# Patient Record
Sex: FEMALE | Race: WHITE | Employment: FULL TIME
[De-identification: names, ages, dates, MRNs, and addresses within clinical notes are randomized per-mention and may not be internally consistent; named-entity substitution may affect disease eponyms.]

---

## 2023-03-09 ENCOUNTER — HOSPITAL ENCOUNTER (OUTPATIENT)
Facility: HOSPITAL | Age: 58
Setting detail: RECURRING SERIES
Discharge: HOME | End: 2023-03-12
Payer: COMMERCIAL

## 2023-03-09 PROCEDURE — 97535 SELF CARE MNGMENT TRAINING: CPT

## 2023-03-09 PROCEDURE — 97161 PT EVAL LOW COMPLEX 20 MIN: CPT

## 2023-03-09 PROCEDURE — 97112 NEUROMUSCULAR REEDUCATION: CPT

## 2023-03-09 NOTE — PROGRESS NOTES
Physical Therapy Evaluation       Patient Name: Lily Batres    Date: 3/9/2023    : 1965  Insurance: Payor: OPTIMA / Plan: [employer] HMO / Product Type: *No Product type* /      Patient  verified yes     Visit #   Current / Total 1 12   Time   In / Out 8:00 8:55   Pain   In / Out 0 0   Subjective Functional Status/Changes: See subjective below   Changes to:  Meds, Allergies, Med Hx, Sx Hx?  If yes, update Summary List no       TREATMENT AREA =  Stress incontinence (female) (male) [N39.3]      SUBJECTIVE    Current symptoms/Complaints: Pt is a 57 year old female who presents to physical therapy with complaints of urinary incontinence, which has been going on for \"years\". It's worsened over the past year. She's currently going through menopause.     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, sitting mostly - tries to get up as much as she can   Exercise/Hobbies: used to walk 6 miles per day, can only make it 3 miles of walking with urinating before and after  Pt Goals: to get off medication she just got on; she wants to be stronger and naturally correct her issues and be able to walk her 6 miles per day    Urinary Symptoms:     Current urinary complaint  Mixed urinary incontinence     Bladder complaint longevity:  5+ years    Bladder symptom progression:  Worsened over past 1-2 years    Frequency of UI: at least 15 times per day, sometimes just  1 drop and other times gushing out     Pad use:  3-4 times per day poise 2 pads, but when she's sick she has to wear a poise 4 replacing it about 10 times     Pad wetness when changed:  Damp     Daytime urinary frequency:  Every 1.5-2 hours hour(s) during the day    Nocturia:  1x/ night    Bowel Symptoms:     Bowel function:  4-5 days/week   Stool Type [facility]): Type 1 - Separate Hard Lumps  Type 2 - Lumpy and Sausage Like  Type 3 - Sausage Shape with Cracks in the Surface  Type 4 - Like a Smooth Sausage or Snake  Type 5 - Soft Blobs with Clear Cut Edges  Type 6 - Mushy Consistency with Ragged Edges   Type 7 - Liquid Consistency with no Solid Pieces    Diet:    Fluid intake:  Fluid  Amount per day   Water 2 - 32 oz bottles of water   Caffeine 8 oz once every few days, herbal teas every few days   Alcohol none   Other none     Weight: 200    Physical Exam Objective Findings:    Pelvic Floor Assessment  Patient was educated on pelvic floor anatomy, structure, and function and implications for current presentation of s/s. Patient consents to pelvic floor assessment. Observation:  Contraction - mild  Bulge - good  Knack - mild reflex    PFM Screen:    Skin Integrity:  [x] Healthy [] Red  [] Labia Atrophy [] Fragile    Sensation: [x] Intact [] Diminished:    Muscle Bulk: [x] Symmetrical  [] Well-developed [] Atrophied:  []L   []R   []B    Prolapse: [x] Cystocele: Grade 1        External trigger point/ muscle tenderness: none    Pelvic floor manual exam: Performed via internal vaginal assessment - no tenderness or tension noted    Pelvic floor MMT    PERF (Performance/Endurance/Repetitions//Flicks): 0/05/5/76    Pelvic muscle release pattern  Full release    30 min []Eval - untimed                               Therapeutic Procedures:   Tx Min Billable or 1:1 Min (if diff from Tx Min) Procedure, Rationale, Specifics   10 10 [id number] Neuromuscular Re-Education (timed):  improve balance, coordination, kinesthetic sense, posture, core stability and proprioception to improve patient's ability to develop conscious control of individual muscles and awareness of position of extremities in order to progress to PLOF and address remaining functional goals. (see flow sheet as applicable)     Details if applicable:     15 15 [id number] Self Care/Home Management (timed):  improve patient knowledge and understanding of physical therapy expectations, procedures and progression and b;adder fitness, urge suppression, bladder irritants  to improve patient's ability to progress to PLOF and address remaining functional goals. (see flow sheet as applicable)     Details if applicable:            Details if applicable:            Details if applicable:            Details if applicable:     25 22 [facility] Totals Reminder: bill using total billable min of TIMED therapeutic procedures (example: do not include dry needle or estim unattended, both untimed codes, in totals to left)  8-22 min = 1 unit; 23-37 min = 2 units; 38-52 min = 3 units; 53-67 min = 4 units; 68-82 min = 5 units   Total Total     [x]  Patient Education billed concurrently with other procedures   [x] Review HEP    [] Progressed/Changed HEP, detail:    [] Other detail:       Pain Level (0-10 scale) post treatment: 0    ASSESSMENT/Changes in Function: Patient is a 56year old female presenting to Physical Therapy with c/o mixed urinary incontinence which is limiting ability function at previous level. Patient presents with decreased strength, coordination, and endurance of the pelvic floor muscles and decreased strength of postural stabilizers. Patient presents with fair understanding of bladder and bowel anatomy/function, dietary irritants, and urge suppression. I feel patient would benefit from skilled therapeutic intervention to optimize highest functional level possible.      Patient will continue to benefit from skilled PT services to modify and progress therapeutic interventions, address functional mobility deficits, address ROM deficits, address strength deficits, analyze and address soft tissue restrictions, analyze and cue movement patterns, analyze and modify body mechanics/ergonomics, and instruct in home and community integration to attain remaining goals. [x]  See Plan of Care  []  See progress note/recertification  []  See Discharge Summary         Progress towards goals / Updated goals:  Short term goals: To be achieved in 6 sessions:  Patient will demonstrate proper dietary/fluid habits and urge suppression strategies that promote bladder and bowel health to aid in management of urinary urgency and incontinence. Status at eval: Patient consuming 64 oz of water and unaware of bladder fitness, dietary irritants and urge suppression strategies. Patient will report improvement in UI complaints evidence by a decrease in pad usage and/or amount of leakage by 25-50% to improve QOL. Status at eval: Patient using 3-4 pads per day, generally damp (amount of leakage). Patient will be able to maintain pelvic floor contraction for 10 seconds, 10 repetitions with no accessory substitution or breath holding. Status at eval: PFMC 10 seconds, 6 repetitions     Long term goals: To be achieved in 12 weeks:  Patient will report bladder continence 50-75% of the time with cough/sneeze/laugh and walking. Status at eval: patient stress and urgency incontinence 15 times  per day    Patient will demonstrate pelvic floor muscle contraction at least 4/5 and ability to maintain contraction for 10 seconds, 10 repetitions.    Status at eval: PFM 3/5, 10 second hold, 6 repetitions    Patient will demonstrate improvement of current complaints evidenced by a 13 point  improvement in FOTO, Pelvic functional disability index score  Status at Eval: Pelvic functional disability index 40    Patient will demonstrate independence with management tools and exercise program that are beneficial for current condition in order to feel comfortable with Pelvic floor PT D/C and not fear exacerbation of current condition or symptoms returning.    Status at eval : patient fearful of return to exercise and unaware of what activities to avoid to avoid exacerbation of current condition    PLAN  []  Upgrade activities as tolerated     [x]  Continue plan of care  []  Update interventions per flow sheet       []  Discharge due to:_  []  Other:_      Corry Reddy, PT 3/9/2023  8:02 AM

## 2023-03-09 NOTE — PROGRESS NOTES
[facility]  [address], [address]  Ph [phone]  Fx [phone]    Plan of Care/ Statement of Necessity for Physical Therapy Services    Patient name: Bryan Darnell Start of Care: 3/9/2023   Referral source: JOSUE Phillips* : 1965    Medical Diagnosis: Stress incontinence (female) (male) [N39.3]   Onset Date:3/1/2021    Treatment Diagnosis: Stress incontinence (female) (male) [N39.3]   Prior Hospitalization: see medical history Provider#: [id number]   Medications: Verified on Patient summary List    Comorbidities: arthritis, thyroid problems, fibromyalgia   Prior Level of Function: no leaking          The Plan of Care and following information is based on the information from the initial evaluation. Assessment/ key information: Patient is a 56year old female presenting to Physical Therapy with c/o mixed urinary incontinence which is limiting ability function at previous level. Patient presents with decreased strength, coordination, and endurance of the pelvic floor muscles and decreased strength of postural stabilizers. Patient presents with fair understanding of bladder and bowel anatomy/function, dietary irritants, and urge suppression. I feel patient would benefit from skilled therapeutic intervention to optimize highest functional level possible. Patient will benefit from skilled PT services to modify and progress therapeutic interventions, address functional mobility deficits, address ROM deficits, address strength deficits, analyze and address soft tissue restrictions, analyze and cue movement patterns, analyze and modify body mechanics/ergonomics, and instruct in home and community integration to attain goals.     Evaluation Complexity HistoryLOW Complexity : Zero comorbidities / personal factors that will impact the outcome / POC ; Examination LOW Complexity : 1-2 Standardized tests and measures addressing body structure, function, activity limitation and / or participation in recreation  ;Presentation LOW Complexity : Stable, uncomplicated  ;Clinical Decision Making MEDIUM Complexity : FOTO score of 26-74 FOTO score = an established functional score where 100 = no disability  Overall Complexity Rating: LOW     Problem List: Pelvic pain/dysfunction, Decreased pelvic floor mm strength, Improper voiding habits, Urinary urgency, and Other  Treatment Plan may include any combination of the following: Therapeutic exercise, Urge suppression techniques, Neuromuscular re-education, Manual therapy, Patient education, and Other  Patient / Family readiness to learn indicated by: asking questions, trying to perform skills, interest, return verbalization , and return demonstration   Persons(s) to be included in education: patient (P)  Barriers to Learning/Limitations: None  Patient Goal (s): to get off medication she just got on; she wants to be stronger and naturally correct her issues and be able to walk her 6 miles per day  Patient Self Reported Health Status: good  Rehabilitation Potential: good    Short term goals: To be achieved in 6 sessions:  Patient will demonstrate proper dietary/fluid habits and urge suppression strategies that promote bladder and bowel health to aid in management of urinary urgency and incontinence. Status at eval: Patient consuming 64 oz of water and unaware of bladder fitness, dietary irritants and urge suppression strategies. Patient will report improvement in UI complaints evidence by a decrease in pad usage and/or amount of leakage by 25-50% to improve QOL. Status at eval: Patient using 3-4 pads per day, generally damp (amount of leakage). Patient will be able to maintain pelvic floor contraction for 10 seconds, 10 repetitions with no accessory substitution or breath holding. Status at eval: PFMC 10 seconds, 6 repetitions      Long term goals:  To be achieved in 12 weeks:  Patient will report bladder continence 50-75% of the time with cough/sneeze/laugh and walking. Status at eval: patient stress and urgency incontinence 15 times  per day     Patient will demonstrate pelvic floor muscle contraction at least 4/5 and ability to maintain contraction for 10 seconds, 10 repetitions. Status at eval: PFM 3/5, 10 second hold, 6 repetitions     Patient will demonstrate improvement of current complaints evidenced by a 13 point  improvement in FOTO, Pelvic functional disability index score  Status at Eval: Pelvic functional disability index 40     Patient will demonstrate independence with management tools and exercise program that are beneficial for current condition in order to feel comfortable with Pelvic floor PT D/C and not fear exacerbation of current condition or symptoms returning. Status at eval : patient fearful of return to exercise and unaware of what activities to avoid to avoid exacerbation of current condition    Frequency / Duration: Patient to be seen for 12 sessions    Patient/ Caregiver education and instruction: Diagnosis, prognosis, self care, activity modification, and exercises     [x]  Plan of care has been reviewed with PTA    Certification Period: 03/09/2023 - 06/01/2023    Declan Alford, PT 3/9/2023 9:43 AM    ________________________________________________________________________    I certify that the above Therapy Services are being furnished while the patient is under my care. I agree with the treatment plan and certify that this therapy is necessary.     Physician's Signature:____________________  Date:____________Time:____________                                      Gwenevere Gilford, APRN*      Please sign and return to [facility]  [address], [address]  Ph [phone]  Fx [phone]

## 2023-03-24 ENCOUNTER — TELEPHONE (OUTPATIENT)
Facility: HOSPITAL | Age: 58
End: 2023-03-24

## 2023-03-28 ENCOUNTER — HOSPITAL ENCOUNTER (OUTPATIENT)
Facility: HOSPITAL | Age: 58
Setting detail: RECURRING SERIES
Discharge: HOME | End: 2023-03-31
Payer: COMMERCIAL

## 2023-03-28 PROCEDURE — 97530 THERAPEUTIC ACTIVITIES: CPT

## 2023-03-28 PROCEDURE — 97112 NEUROMUSCULAR REEDUCATION: CPT

## 2023-03-28 PROCEDURE — 97110 THERAPEUTIC EXERCISES: CPT

## 2023-03-28 NOTE — PROGRESS NOTES
PHYSICAL / OCCUPATIONAL THERAPY - DAILY TREATMENT NOTE (updated )    Patient Name: Skylar Suarez    Date: 3/28/2023    : 1965  Insurance: Payor: Ursula Cranker / Plan: OPTIMA HMO / Product Type: *No Product type* /      Patient  verified Yes     Visit #   Current / Total 2 12   Time   In / Out 4:30 5:23   Pain   In / Out 0 0   Subjective Functional Status/Changes: Pt reports only leaking once, sneezing while standing   Changes to:  Meds, Allergies, Med Hx, Sx Hx? If yes, update Summary List no       TREATMENT AREA =  Stress incontinence (female) (male) [N39.3]    OBJECTIVE    Therapeutic Procedures: Tx Min Billable or 1:1 Min (if diff from Tx Min) Procedure, Rationale, Specifics   10  47771 Therapeutic Exercise (timed):  increase ROM, strength, coordination, balance, and proprioception to improve patient's ability to progress to PLOF and address remaining functional goals. (see flow sheet as applicable)     Details if applicable:       10  95097 Therapeutic Activity (timed):  use of dynamic activities replicating functional movements to increase ROM, strength, coordination, balance, and proprioception in order to improve patient's ability to progress to PLOF and address remaining functional goals. (see flow sheet as applicable)     Details if applicable:  bladder education   33  62057 Neuromuscular Re-Education (timed):  improve balance, coordination, kinesthetic sense, posture, core stability and proprioception to improve patient's ability to develop conscious control of individual muscles and awareness of position of extremities in order to progress to PLOF and address remaining functional goals.  (see flow sheet as applicable)     Details if applicable:            Details if applicable:            Details if applicable:     48  [address] Totals Reminder: bill using total billable min of TIMED therapeutic procedures (example: do not include dry needle or estim unattended, both untimed codes, in totals to

## 2023-04-04 ENCOUNTER — HOSPITAL ENCOUNTER (OUTPATIENT)
Facility: HOSPITAL | Age: 58
Setting detail: RECURRING SERIES
Discharge: HOME | End: 2023-04-07
Payer: COMMERCIAL

## 2023-04-04 PROCEDURE — 97112 NEUROMUSCULAR REEDUCATION: CPT

## 2023-04-04 PROCEDURE — 97530 THERAPEUTIC ACTIVITIES: CPT

## 2023-04-04 PROCEDURE — 97110 THERAPEUTIC EXERCISES: CPT

## 2023-04-04 NOTE — PROGRESS NOTES
10 seconds, 10 repetitions with no accessory substitution or breath holding. Status at eval: PFMC 10 seconds, 6 repetitions   Current:   Surface EMG demonstrated good motor recruitment and some fatigue with 10 second holds  Progressing 4/4/2023     Long term goals: To be achieved in 9 weeks:  Patient will report bladder continence 50-75% of the time with cough/sneeze/laugh and walking. Status at eval: patient stress and urgency incontinence 15 times  per day  Current: pt thinks she's going to the bathroom every 2 - 2.5 hours apart 3/28/2023 progressing  Current:  Patient reports using one pad/day and has 1 episode of leakage after drinking a glass of wine. Patient reports feeling about 80% better. 4/4/2023  Progressing     Patient will demonstrate pelvic floor muscle contraction at least 4/5 and ability to maintain contraction for 10 seconds, 10 repetitions. Status at eval: PFM 3/5, 10 second hold, 6 repetitions  Current:   Surface EMG demonstrated good motor recruitment and some fatigue with 10 second holds  Progressing 4/4/2023     Patient will demonstrate improvement of current complaints evidenced by a 13 point  improvement in FOTO, Pelvic functional disability index score  Status at Eval: Pelvic functional disability index 40  Current: Will test at the 5th visit 4/4/2023     Patient will demonstrate independence with management tools and exercise program that are beneficial for current condition in order to feel comfortable with Pelvic floor PT D/C and not fear exacerbation of current condition or symptoms returning. Status at eval : patient fearful of return to exercise and unaware of what activities to avoid to avoid exacerbation of current condition  Current:  Patient reports good compliance with HEP and pelvic floor contractions.   Progressing 4/4/2023       Summary of Care/ Key Functional Changes: Patient is a 56year old female who has attended 3 physical therapy appointments for urinary
repetitions  Current:   Surface EMG demonstrated good motor recruitment and some fatigue with 10 second holds  Progressing 4/4/2023     Patient will demonstrate improvement of current complaints evidenced by a 13 point  improvement in FOTO, Pelvic functional disability index score  Status at Eval: Pelvic functional disability index 40  Current: Will test at the 5th visit 4/4/2023     Patient will demonstrate independence with management tools and exercise program that are beneficial for current condition in order to feel comfortable with Pelvic floor PT D/C and not fear exacerbation of current condition or symptoms returning. Status at eval : patient fearful of return to exercise and unaware of what activities to avoid to avoid exacerbation of current condition  Current:  Patient reports good compliance with HEP and pelvic floor contractions.   Progressing 4/4/2023          PLAN  Yes  Continue plan of care  []  Upgrade activities as tolerated  []  Discharge due to :  []  Other:    Cathleen Mar, PT    4/4/2023    7:58 AM    Future Appointments   Date Time Provider Hayder Roy   4/4/2023  8:00 AM Cathleen Mar, PT [facility]   4/11/2023  8:00 AM Jenny Howard PT [facility]

## 2023-04-13 ENCOUNTER — HOSPITAL ENCOUNTER (INPATIENT)
Facility: HOSPITAL | Age: 58
LOS: 1 days | Discharge: HOME | DRG: 287 | End: 2023-04-14
Attending: EMERGENCY MEDICINE | Admitting: FAMILY MEDICINE
Payer: COMMERCIAL

## 2023-04-13 ENCOUNTER — HOSPITAL ENCOUNTER (OUTPATIENT)
Facility: HOSPITAL | Age: 58
Discharge: HOME | End: 2023-04-16
Payer: COMMERCIAL

## 2023-04-13 ENCOUNTER — APPOINTMENT (OUTPATIENT)
Facility: HOSPITAL | Age: 58
DRG: 287 | End: 2023-04-13
Payer: COMMERCIAL

## 2023-04-13 DIAGNOSIS — Z82.49 FAMILY HISTORY OF CORONARY ARTERY DISEASE: ICD-10-CM

## 2023-04-13 DIAGNOSIS — R07.2 PRECORDIAL CHEST PAIN: Primary | ICD-10-CM

## 2023-04-13 DIAGNOSIS — I20.0 UNSTABLE ANGINA (HCC): ICD-10-CM

## 2023-04-13 DIAGNOSIS — R07.9 CHEST PAIN, UNSPECIFIED TYPE: ICD-10-CM

## 2023-04-13 DIAGNOSIS — R07.89 OTHER CHEST PAIN: ICD-10-CM

## 2023-04-13 DIAGNOSIS — R07.9 ACUTE CHEST PAIN: Primary | ICD-10-CM

## 2023-04-13 DIAGNOSIS — R94.39 ABNORMAL STRESS TEST: ICD-10-CM

## 2023-04-13 PROBLEM — E03.9 HYPOTHYROIDISM: Status: ACTIVE | Noted: 2023-04-13

## 2023-04-13 LAB
ANION GAP SERPL CALC-SCNC: 2 MMOL/L (ref 3–18)
APTT PPP: 25.9 SEC (ref 23–36.4)
BASOPHILS # BLD: 0 K/UL (ref 0–0.1)
BASOPHILS NFR BLD: 1 % (ref 0–2)
BUN SERPL-MCNC: 24 MG/DL (ref 7–18)
BUN/CREAT SERPL: 25 (ref 12–20)
CALCIUM SERPL-MCNC: 9.4 MG/DL (ref 8.5–10.1)
CHLORIDE SERPL-SCNC: 116 MMOL/L (ref 100–111)
CO2 SERPL-SCNC: 26 MMOL/L (ref 21–32)
CREAT SERPL-MCNC: 0.96 MG/DL (ref 0.6–1.3)
DIFFERENTIAL METHOD BLD: NORMAL
ECHO BSA: 2.01 M2
EOSINOPHIL # BLD: 0.2 K/UL (ref 0–0.4)
EOSINOPHIL NFR BLD: 4 % (ref 0–5)
ERYTHROCYTE [DISTWIDTH] IN BLOOD BY AUTOMATED COUNT: 12.3 % (ref 11.6–14.5)
GLUCOSE SERPL-MCNC: 85 MG/DL (ref 74–99)
HCT VFR BLD AUTO: 42.9 % (ref 35–45)
HGB BLD-MCNC: 14.4 G/DL (ref 12–16)
IMM GRANULOCYTES # BLD AUTO: 0 K/UL (ref 0–0.04)
IMM GRANULOCYTES NFR BLD AUTO: 0 % (ref 0–0.5)
INR PPP: 0.9 (ref 0.8–1.2)
LYMPHOCYTES # BLD: 1.6 K/UL (ref 0.9–3.6)
LYMPHOCYTES NFR BLD: 34 % (ref 21–52)
MCH RBC QN AUTO: 30 PG (ref 24–34)
MCHC RBC AUTO-ENTMCNC: 33.6 G/DL (ref 31–37)
MCV RBC AUTO: 89.4 FL (ref 78–100)
MONOCYTES # BLD: 0.3 K/UL (ref 0.05–1.2)
MONOCYTES NFR BLD: 7 % (ref 3–10)
NEUTS SEG # BLD: 2.6 K/UL (ref 1.8–8)
NEUTS SEG NFR BLD: 54 % (ref 40–73)
NRBC # BLD: 0 K/UL (ref 0–0.01)
NRBC BLD-RTO: 0 PER 100 WBC
NUC STRESS EJECTION FRACTION: 76 %
PLATELET # BLD AUTO: 190 K/UL (ref 135–420)
PMV BLD AUTO: 10.4 FL (ref 9.2–11.8)
POTASSIUM SERPL-SCNC: 3.7 MMOL/L (ref 3.5–5.5)
PROTHROMBIN TIME: 12.8 SEC (ref 11.5–15.2)
RBC # BLD AUTO: 4.8 M/UL (ref 4.2–5.3)
SODIUM SERPL-SCNC: 144 MMOL/L (ref 136–145)
STRESS BASELINE ST DEPRESSION: 0 MM
STRESS ESTIMATED WORKLOAD: 1 METS
STRESS PEAK DIAS BP: 92 MMHG
STRESS PEAK SYS BP: 166 MMHG
STRESS PERCENT HR ACHIEVED: 73 %
STRESS POST PEAK HR: 121 BPM
STRESS RATE PRESSURE PRODUCT: NORMAL BPM*MMHG
STRESS TARGET HR: 165 BPM
TID: 0.97
TROPONIN I SERPL HS-MCNC: 4 NG/L (ref 0–54)
TROPONIN I SERPL HS-MCNC: 4 NG/L (ref 0–54)
TROPONIN I SERPL HS-MCNC: 5 NG/L (ref 0–54)
WBC # BLD AUTO: 4.7 K/UL (ref 4.6–13.2)

## 2023-04-13 PROCEDURE — A9500 TC99M SESTAMIBI: HCPCS | Performed by: INTERNAL MEDICINE

## 2023-04-13 PROCEDURE — 93005 ELECTROCARDIOGRAM TRACING: CPT | Performed by: EMERGENCY MEDICINE

## 2023-04-13 PROCEDURE — G0378 HOSPITAL OBSERVATION PER HR: HCPCS

## 2023-04-13 PROCEDURE — 71046 X-RAY EXAM CHEST 2 VIEWS: CPT

## 2023-04-13 PROCEDURE — 93017 CV STRESS TEST TRACING ONLY: CPT

## 2023-04-13 PROCEDURE — 36415 COLL VENOUS BLD VENIPUNCTURE: CPT

## 2023-04-13 PROCEDURE — 85025 COMPLETE CBC W/AUTO DIFF WBC: CPT

## 2023-04-13 PROCEDURE — 84443 ASSAY THYROID STIM HORMONE: CPT

## 2023-04-13 PROCEDURE — 96372 THER/PROPH/DIAG INJ SC/IM: CPT

## 2023-04-13 PROCEDURE — 99285 EMERGENCY DEPT VISIT HI MDM: CPT

## 2023-04-13 PROCEDURE — 6370000000 HC RX 637 (ALT 250 FOR IP): Performed by: FAMILY MEDICINE

## 2023-04-13 PROCEDURE — 84484 ASSAY OF TROPONIN QUANT: CPT

## 2023-04-13 PROCEDURE — 3430000000 HC RX DIAGNOSTIC RADIOPHARMACEUTICAL: Performed by: INTERNAL MEDICINE

## 2023-04-13 PROCEDURE — 80048 BASIC METABOLIC PNL TOTAL CA: CPT

## 2023-04-13 PROCEDURE — 6360000002 HC RX W HCPCS: Performed by: EMERGENCY MEDICINE

## 2023-04-13 PROCEDURE — 1100000003 HC PRIVATE W/ TELEMETRY

## 2023-04-13 PROCEDURE — 85610 PROTHROMBIN TIME: CPT

## 2023-04-13 PROCEDURE — 6360000002 HC RX W HCPCS: Performed by: INTERNAL MEDICINE

## 2023-04-13 PROCEDURE — 85730 THROMBOPLASTIN TIME PARTIAL: CPT

## 2023-04-13 PROCEDURE — 6370000000 HC RX 637 (ALT 250 FOR IP): Performed by: EMERGENCY MEDICINE

## 2023-04-13 RX ORDER — ASPIRIN 81 MG/1
81 TABLET, CHEWABLE ORAL DAILY
Status: DISCONTINUED | OUTPATIENT
Start: 2023-04-14 | End: 2023-04-14 | Stop reason: HOSPADM

## 2023-04-13 RX ORDER — OMEPRAZOLE 40 MG/1
1 CAPSULE, DELAYED RELEASE ORAL DAILY
Status: ON HOLD | COMMUNITY
Start: 2023-04-10 | End: 2023-04-13

## 2023-04-13 RX ORDER — ENOXAPARIN SODIUM 100 MG/ML
1 INJECTION SUBCUTANEOUS EVERY 12 HOURS
Status: DISCONTINUED | OUTPATIENT
Start: 2023-04-13 | End: 2023-04-14

## 2023-04-13 RX ORDER — MORPHINE SULFATE 4 MG/ML
4 INJECTION, SOLUTION INTRAMUSCULAR; INTRAVENOUS EVERY 4 HOURS PRN
Status: DISCONTINUED | OUTPATIENT
Start: 2023-04-13 | End: 2023-04-14 | Stop reason: HOSPADM

## 2023-04-13 RX ORDER — NITROGLYCERIN 0.4 MG/1
0.4 TABLET SUBLINGUAL EVERY 5 MIN PRN
Status: DISCONTINUED | OUTPATIENT
Start: 2023-04-13 | End: 2023-04-14 | Stop reason: HOSPADM

## 2023-04-13 RX ORDER — SODIUM CHLORIDE 0.9 % (FLUSH) 0.9 %
5-40 SYRINGE (ML) INJECTION PRN
Status: DISCONTINUED | OUTPATIENT
Start: 2023-04-13 | End: 2023-04-14 | Stop reason: HOSPADM

## 2023-04-13 RX ORDER — ASPIRIN 81 MG/1
1 TABLET ORAL DAILY
COMMUNITY
Start: 2023-04-10

## 2023-04-13 RX ORDER — VIBEGRON 75 MG/1
1 TABLET, FILM COATED ORAL DAILY
COMMUNITY
Start: 2023-02-27

## 2023-04-13 RX ORDER — POLYETHYLENE GLYCOL 3350 17 G/17G
17 POWDER, FOR SOLUTION ORAL DAILY PRN
Status: DISCONTINUED | OUTPATIENT
Start: 2023-04-13 | End: 2023-04-14 | Stop reason: HOSPADM

## 2023-04-13 RX ORDER — ONDANSETRON 4 MG/1
4 TABLET, ORALLY DISINTEGRATING ORAL EVERY 8 HOURS PRN
Status: DISCONTINUED | OUTPATIENT
Start: 2023-04-13 | End: 2023-04-14 | Stop reason: HOSPADM

## 2023-04-13 RX ORDER — SODIUM CHLORIDE 9 MG/ML
INJECTION, SOLUTION INTRAVENOUS PRN
Status: DISCONTINUED | OUTPATIENT
Start: 2023-04-13 | End: 2023-04-14 | Stop reason: HOSPADM

## 2023-04-13 RX ORDER — SODIUM CHLORIDE 0.9 % (FLUSH) 0.9 %
5-40 SYRINGE (ML) INJECTION EVERY 12 HOURS SCHEDULED
Status: DISCONTINUED | OUTPATIENT
Start: 2023-04-13 | End: 2023-04-14 | Stop reason: HOSPADM

## 2023-04-13 RX ORDER — ISOSORBIDE DINITRATE 5 MG/1
1 TABLET ORAL 2 TIMES DAILY
COMMUNITY
Start: 2023-04-10

## 2023-04-13 RX ORDER — TETRAKIS(2-METHOXYISOBUTYLISOCYANIDE)COPPER(I) TETRAFLUOROBORATE 1 MG/ML
11.1 INJECTION, POWDER, LYOPHILIZED, FOR SOLUTION INTRAVENOUS
Status: COMPLETED | OUTPATIENT
Start: 2023-04-13 | End: 2023-04-13

## 2023-04-13 RX ORDER — ACETAMINOPHEN 325 MG/1
650 TABLET ORAL EVERY 6 HOURS PRN
Status: DISCONTINUED | OUTPATIENT
Start: 2023-04-13 | End: 2023-04-14 | Stop reason: HOSPADM

## 2023-04-13 RX ORDER — ATORVASTATIN CALCIUM 20 MG/1
40 TABLET, FILM COATED ORAL NIGHTLY
Status: DISCONTINUED | OUTPATIENT
Start: 2023-04-13 | End: 2023-04-14 | Stop reason: HOSPADM

## 2023-04-13 RX ORDER — ASPIRIN 81 MG/1
324 TABLET, CHEWABLE ORAL
Status: COMPLETED | OUTPATIENT
Start: 2023-04-13 | End: 2023-04-13

## 2023-04-13 RX ORDER — ACETAMINOPHEN 650 MG/1
650 SUPPOSITORY RECTAL EVERY 6 HOURS PRN
Status: DISCONTINUED | OUTPATIENT
Start: 2023-04-13 | End: 2023-04-14 | Stop reason: HOSPADM

## 2023-04-13 RX ORDER — ONDANSETRON 2 MG/ML
4 INJECTION INTRAMUSCULAR; INTRAVENOUS EVERY 6 HOURS PRN
Status: DISCONTINUED | OUTPATIENT
Start: 2023-04-13 | End: 2023-04-14 | Stop reason: HOSPADM

## 2023-04-13 RX ORDER — TETRAKIS(2-METHOXYISOBUTYLISOCYANIDE)COPPER(I) TETRAFLUOROBORATE 1 MG/ML
35.4 INJECTION, POWDER, LYOPHILIZED, FOR SOLUTION INTRAVENOUS
Status: COMPLETED | OUTPATIENT
Start: 2023-04-13 | End: 2023-04-13

## 2023-04-13 RX ORDER — LEVOTHYROXINE SODIUM 0.12 MG/1
125 TABLET ORAL
COMMUNITY

## 2023-04-13 RX ADMIN — TETRAKIS(2-METHOXYISOBUTYLISOCYANIDE)COPPER(I) TETRAFLUOROBORATE 11.1 MILLICURIE: 1 INJECTION, POWDER, LYOPHILIZED, FOR SOLUTION INTRAVENOUS at 08:56

## 2023-04-13 RX ADMIN — REGADENOSON 0.4 MG: 0.08 INJECTION, SOLUTION INTRAVENOUS at 11:19

## 2023-04-13 RX ADMIN — ATORVASTATIN CALCIUM 40 MG: 20 TABLET, FILM COATED ORAL at 20:12

## 2023-04-13 RX ADMIN — TETRAKIS(2-METHOXYISOBUTYLISOCYANIDE)COPPER(I) TETRAFLUOROBORATE 35.4 MILLICURIE: 1 INJECTION, POWDER, LYOPHILIZED, FOR SOLUTION INTRAVENOUS at 11:19

## 2023-04-13 RX ADMIN — ASPIRIN 324 MG: 81 TABLET, CHEWABLE ORAL at 12:35

## 2023-04-13 RX ADMIN — ENOXAPARIN SODIUM 90 MG: 100 INJECTION SUBCUTANEOUS at 13:23

## 2023-04-13 ASSESSMENT — PAIN SCALES - GENERAL
PAINLEVEL_OUTOF10: 0
PAINLEVEL_OUTOF10: 0

## 2023-04-13 NOTE — H&P
mucosa, and tongue normal.    Neck: Supple, symmetrical, trachea midline, no adenopathy. Lungs:   Clear to auscultation bilaterally. Heart:  Regular rate and rhythm, S1, S2 normal, no murmur, click, rub or gallop. Abdomen: Soft, non-tender. Bowel sounds normal. No masses,  No organomegaly. Extremities: Extremities normal, atraumatic, no cyanosis or edema. Capillary refill normal.   Pulses: 2+ and symmetric all extremities. Skin: Skin color pink, turgor normal. No rashes or lesions   Neurologic: CNII-XII intact. No focal motor or sensory deficit.        Labs Reviewed:    Recent Results (from the past 24 hour(s))   Transthoracic echocardiogram (TTE) complete with contrast, bubble, strain, and 3D PRN    Collection Time: 04/13/23  8:59 AM   Result Value Ref Range    IVSd 1.0 (A) 0.6 - 0.9 cm    LVIDd 5.2 3.9 - 5.3 cm    LVIDs 4.0 cm    LVOT Diameter 2.0 cm    LVPWd 0.9 0.6 - 0.9 cm    EF BP 60 55 - 100 %    LV Ejection Fraction A2C 64 %    LV Ejection Fraction A4C 53 %    LV EDV A2C 109 mL    LV EDV A4C 109 mL    LV EDV  (A) 56 - 104 mL    LV ESV A2C 39 mL    LV ESV A4C 52 mL    LV ESV BP 45 19 - 49 mL    LVOT Peak Gradient 3 mmHg    LVOT Mean Gradient 2 mmHg    LVOT SV 62.8 ml    LVOT Peak Velocity 0.9 m/s    LVOT VTI 20.0 cm    RVIDd 3.9 cm    RV Free Wall Peak S' 10 cm/s    RVOT Peak Gradient 1 mmHg    RVOT Peak Velocity 0.6 m/s    LA Diameter 4.4 cm    LA Volume A/L 58 mL    LA Volume 2C 58 (A) 22 - 52 mL    LA Volume 4C 55 (A) 22 - 52 mL    LA Volume BP 53 (A) 22 - 52 mL    RA Volume 38 ml    RA Volume 34 ml    AV Area by Peak Velocity 2.4 cm2    AV Area by VTI 2.4 cm2    AV Peak Gradient 5 mmHg    AV Mean Gradient 3 mmHg    AV Peak Velocity 1.2 m/s    AV Mean Velocity 0.8 m/s    AV VTI 27.2 cm    MV A Velocity 0.59 m/s    MV E Wave Deceleration Time 194.6 ms    MV E Velocity 0.59 m/s    LV E' Lateral Velocity 9 cm/s    LV E' Septal Velocity 7 cm/s    PV Peak Gradient 3 mmHg    PV Max

## 2023-04-13 NOTE — ED PROVIDER NOTES
[facility]  EMERGENCY DEPARTMENT ENCOUNTER    Patient Name: Gunjan Monterroso  MRN: [id number]  YOB: 1965  Provider: Sobeida Ahumada MD  PCP: Gabriella Sebastian DO   Time/Date of evaluation: 12:17 PM EDT on 4/13/23    History of Presenting Illness     History Provided by: Patient  History is limited by: Nothing     HISTORY:   Gunjan Monterroso is a 56 y.o. female presenting to the emergency department after being sent in by her cardiologist Dr. Rogelio Manuel. She was having a stress test today and developed chest pain. She was given nitroglycerin after the stress test which did relieve the pain. She has about a 4-month history of left-sided chest tightness that goes into her back left arm and jaw. This is intermittent and not necessarily caused by exertion. Today the nuclear stress test because the same pain. She does have some shortness of breath with the pain. She is currently asymptomatic. She denies any current chest pain or shortness of breath. Denies any back pain. No known risk factors for DVT or PE. Nursing Notes were all reviewed and agreed with or any disagreements were addressed in the HPI. Past History     PAST MEDICAL HISTORY:  No past medical history on file. PAST SURGICAL HISTORY:  No past surgical history on file. FAMILY HISTORY:  No family history on file. SOCIAL HISTORY:       MEDICATIONS:  Current Facility-Administered Medications   Medication Dose Route Frequency Provider Last Rate Last Admin    enoxaparin (LOVENOX) injection 90 mg  1 mg/kg SubCUTAneous Q12H Sobeida Ahumada MD   90 mg at 04/13/23 1323     No current outpatient medications on file.      Facility-Administered Medications Ordered in Other Encounters   Medication Dose Route Frequency Provider Last Rate Last Admin    nitroGLYCERIN (NITROLINGUAL) 0.4 mg spray 1 spray  1 spray SubLINGual Q5 Min PRN Sanna Medina MD   1 spray at 04/13/23 1140       ALLERGIES:  Allergies   Allergen Reactions unknown

## 2023-04-13 NOTE — PROGRESS NOTES
Received pt from ED in stable condition. Vitals completed, skin check completed, admission questions completed. Tele box attached.  is at the bedside.

## 2023-04-14 VITALS
OXYGEN SATURATION: 99 % | HEART RATE: 81 BPM | WEIGHT: 207.67 LBS | BODY MASS INDEX: 38.6 KG/M2 | SYSTOLIC BLOOD PRESSURE: 125 MMHG | TEMPERATURE: 97.6 F | RESPIRATION RATE: 20 BRPM | DIASTOLIC BLOOD PRESSURE: 83 MMHG

## 2023-04-14 LAB
ALBUMIN SERPL-MCNC: 3.5 G/DL (ref 3.4–5)
ALBUMIN/GLOB SERPL: 1.3 (ref 0.8–1.7)
ALP SERPL-CCNC: 55 U/L (ref 45–117)
ALT SERPL-CCNC: 21 U/L (ref 13–56)
ANION GAP SERPL CALC-SCNC: 4 MMOL/L (ref 3–18)
AST SERPL-CCNC: 15 U/L (ref 10–38)
BILIRUB SERPL-MCNC: 0.7 MG/DL (ref 0.2–1)
BUN SERPL-MCNC: 22 MG/DL (ref 7–18)
BUN/CREAT SERPL: 25 (ref 12–20)
CALCIUM SERPL-MCNC: 9.1 MG/DL (ref 8.5–10.1)
CHLORIDE SERPL-SCNC: 113 MMOL/L (ref 100–111)
CHOLEST SERPL-MCNC: 191 MG/DL
CO2 SERPL-SCNC: 25 MMOL/L (ref 21–32)
CREAT SERPL-MCNC: 0.89 MG/DL (ref 0.6–1.3)
EKG ATRIAL RATE: 64 BPM
EKG ATRIAL RATE: 73 BPM
EKG DIAGNOSIS: NORMAL
EKG DIAGNOSIS: NORMAL
EKG P AXIS: 26 DEGREES
EKG P AXIS: 51 DEGREES
EKG P-R INTERVAL: 166 MS
EKG P-R INTERVAL: 186 MS
EKG Q-T INTERVAL: 390 MS
EKG Q-T INTERVAL: 400 MS
EKG QRS DURATION: 86 MS
EKG QRS DURATION: 90 MS
EKG QTC CALCULATION (BAZETT): 402 MS
EKG QTC CALCULATION (BAZETT): 440 MS
EKG R AXIS: 23 DEGREES
EKG R AXIS: 32 DEGREES
EKG T AXIS: 32 DEGREES
EKG T AXIS: 35 DEGREES
EKG VENTRICULAR RATE: 64 BPM
EKG VENTRICULAR RATE: 73 BPM
ERYTHROCYTE [DISTWIDTH] IN BLOOD BY AUTOMATED COUNT: 12.4 % (ref 11.6–14.5)
EST. AVERAGE GLUCOSE BLD GHB EST-MCNC: 91 MG/DL
GLOBULIN SER CALC-MCNC: 2.7 G/DL (ref 2–4)
GLUCOSE SERPL-MCNC: 85 MG/DL (ref 74–99)
HBA1C MFR BLD: 4.8 % (ref 4.2–5.6)
HCG SERPL QL: NEGATIVE
HCT VFR BLD AUTO: 39.6 % (ref 35–45)
HDLC SERPL-MCNC: 50 MG/DL (ref 40–60)
HDLC SERPL: 3.8 (ref 0–5)
HGB BLD-MCNC: 13.2 G/DL (ref 12–16)
LDLC SERPL CALC-MCNC: 110 MG/DL (ref 0–100)
LIPID PANEL: ABNORMAL
MCH RBC QN AUTO: 29.7 PG (ref 24–34)
MCHC RBC AUTO-ENTMCNC: 33.3 G/DL (ref 31–37)
MCV RBC AUTO: 89.2 FL (ref 78–100)
NRBC # BLD: 0 K/UL (ref 0–0.01)
NRBC BLD-RTO: 0 PER 100 WBC
PLATELET # BLD AUTO: 174 K/UL (ref 135–420)
PMV BLD AUTO: 10.3 FL (ref 9.2–11.8)
POTASSIUM SERPL-SCNC: 3.9 MMOL/L (ref 3.5–5.5)
PROT SERPL-MCNC: 6.2 G/DL (ref 6.4–8.2)
RBC # BLD AUTO: 4.44 M/UL (ref 4.2–5.3)
SODIUM SERPL-SCNC: 142 MMOL/L (ref 136–145)
TRIGL SERPL-MCNC: 155 MG/DL
TROPONIN I SERPL HS-MCNC: 5 NG/L (ref 0–54)
TROPONIN I SERPL HS-MCNC: 6 NG/L (ref 0–54)
TSH SERPL DL<=0.05 MIU/L-ACNC: 0.98 UIU/ML (ref 0.36–3.74)
VLDLC SERPL CALC-MCNC: 31 MG/DL
WBC # BLD AUTO: 4.4 K/UL (ref 4.6–13.2)

## 2023-04-14 PROCEDURE — 6370000000 HC RX 637 (ALT 250 FOR IP): Performed by: INTERNAL MEDICINE

## 2023-04-14 PROCEDURE — C1894 INTRO/SHEATH, NON-LASER: HCPCS | Performed by: INTERNAL MEDICINE

## 2023-04-14 PROCEDURE — G0378 HOSPITAL OBSERVATION PER HR: HCPCS

## 2023-04-14 PROCEDURE — 2500000003 HC RX 250 WO HCPCS: Performed by: INTERNAL MEDICINE

## 2023-04-14 PROCEDURE — 6370000000 HC RX 637 (ALT 250 FOR IP): Performed by: FAMILY MEDICINE

## 2023-04-14 PROCEDURE — 6360000004 HC RX CONTRAST MEDICATION: Performed by: INTERNAL MEDICINE

## 2023-04-14 PROCEDURE — 93458 L HRT ARTERY/VENTRICLE ANGIO: CPT | Performed by: INTERNAL MEDICINE

## 2023-04-14 PROCEDURE — 2709999900 HC NON-CHARGEABLE SUPPLY: Performed by: INTERNAL MEDICINE

## 2023-04-14 PROCEDURE — 84703 CHORIONIC GONADOTROPIN ASSAY: CPT

## 2023-04-14 PROCEDURE — 99152 MOD SED SAME PHYS/QHP 5/>YRS: CPT | Performed by: INTERNAL MEDICINE

## 2023-04-14 PROCEDURE — 84484 ASSAY OF TROPONIN QUANT: CPT

## 2023-04-14 PROCEDURE — B2111ZZ FLUOROSCOPY OF MULTIPLE CORONARY ARTERIES USING LOW OSMOLAR CONTRAST: ICD-10-PCS | Performed by: INTERNAL MEDICINE

## 2023-04-14 PROCEDURE — C1769 GUIDE WIRE: HCPCS | Performed by: INTERNAL MEDICINE

## 2023-04-14 PROCEDURE — 80053 COMPREHEN METABOLIC PANEL: CPT

## 2023-04-14 PROCEDURE — 83036 HEMOGLOBIN GLYCOSYLATED A1C: CPT

## 2023-04-14 PROCEDURE — 6360000002 HC RX W HCPCS: Performed by: INTERNAL MEDICINE

## 2023-04-14 PROCEDURE — B2151ZZ FLUOROSCOPY OF LEFT HEART USING LOW OSMOLAR CONTRAST: ICD-10-PCS | Performed by: INTERNAL MEDICINE

## 2023-04-14 PROCEDURE — 1100000003 HC PRIVATE W/ TELEMETRY

## 2023-04-14 PROCEDURE — 80061 LIPID PANEL: CPT

## 2023-04-14 PROCEDURE — 2580000003 HC RX 258: Performed by: FAMILY MEDICINE

## 2023-04-14 PROCEDURE — C1887 CATHETER, GUIDING: HCPCS | Performed by: INTERNAL MEDICINE

## 2023-04-14 PROCEDURE — 4A023N7 MEASUREMENT OF CARDIAC SAMPLING AND PRESSURE, LEFT HEART, PERCUTANEOUS APPROACH: ICD-10-PCS | Performed by: INTERNAL MEDICINE

## 2023-04-14 PROCEDURE — 85027 COMPLETE CBC AUTOMATED: CPT

## 2023-04-14 PROCEDURE — 36415 COLL VENOUS BLD VENIPUNCTURE: CPT

## 2023-04-14 RX ORDER — SODIUM CHLORIDE 0.9 % (FLUSH) 0.9 %
5-40 SYRINGE (ML) INJECTION PRN
Status: DISCONTINUED | OUTPATIENT
Start: 2023-04-14 | End: 2023-04-14 | Stop reason: HOSPADM

## 2023-04-14 RX ORDER — LIDOCAINE HYDROCHLORIDE 10 MG/ML
INJECTION, SOLUTION INFILTRATION; PERINEURAL PRN
Status: DISCONTINUED | OUTPATIENT
Start: 2023-04-14 | End: 2023-04-14 | Stop reason: HOSPADM

## 2023-04-14 RX ORDER — MIDAZOLAM HYDROCHLORIDE 1 MG/ML
INJECTION INTRAMUSCULAR; INTRAVENOUS PRN
Status: DISCONTINUED | OUTPATIENT
Start: 2023-04-14 | End: 2023-04-14 | Stop reason: HOSPADM

## 2023-04-14 RX ORDER — ACETAMINOPHEN 325 MG/1
650 TABLET ORAL EVERY 4 HOURS PRN
Status: DISCONTINUED | OUTPATIENT
Start: 2023-04-14 | End: 2023-04-14 | Stop reason: HOSPADM

## 2023-04-14 RX ORDER — ATORVASTATIN CALCIUM 40 MG/1
40 TABLET, FILM COATED ORAL NIGHTLY
Qty: 30 TABLET | Refills: 3 | Status: SHIPPED | OUTPATIENT
Start: 2023-04-14

## 2023-04-14 RX ORDER — HEPARIN SODIUM 200 [USP'U]/100ML
INJECTION, SOLUTION INTRAVENOUS CONTINUOUS PRN
Status: COMPLETED | OUTPATIENT
Start: 2023-04-14 | End: 2023-04-14

## 2023-04-14 RX ORDER — ISOSORBIDE DINITRATE 10 MG/1
2.5 TABLET ORAL 2 TIMES DAILY
Status: DISCONTINUED | OUTPATIENT
Start: 2023-04-14 | End: 2023-04-14

## 2023-04-14 RX ORDER — SODIUM CHLORIDE 9 MG/ML
INJECTION, SOLUTION INTRAVENOUS PRN
Status: DISCONTINUED | OUTPATIENT
Start: 2023-04-14 | End: 2023-04-14 | Stop reason: HOSPADM

## 2023-04-14 RX ORDER — VERAPAMIL HYDROCHLORIDE 2.5 MG/ML
INJECTION, SOLUTION INTRAVENOUS PRN
Status: DISCONTINUED | OUTPATIENT
Start: 2023-04-14 | End: 2023-04-14 | Stop reason: HOSPADM

## 2023-04-14 RX ORDER — HEPARIN SODIUM 1000 [USP'U]/ML
INJECTION, SOLUTION INTRAVENOUS; SUBCUTANEOUS PRN
Status: DISCONTINUED | OUTPATIENT
Start: 2023-04-14 | End: 2023-04-14 | Stop reason: HOSPADM

## 2023-04-14 RX ORDER — SODIUM CHLORIDE 0.9 % (FLUSH) 0.9 %
5-40 SYRINGE (ML) INJECTION EVERY 12 HOURS SCHEDULED
Status: DISCONTINUED | OUTPATIENT
Start: 2023-04-14 | End: 2023-04-14 | Stop reason: HOSPADM

## 2023-04-14 RX ADMIN — ASPIRIN 81 MG: 81 TABLET, CHEWABLE ORAL at 08:34

## 2023-04-14 RX ADMIN — LEVOTHYROXINE SODIUM 125 MCG: 0.1 TABLET ORAL at 06:20

## 2023-04-14 RX ADMIN — SODIUM CHLORIDE, PRESERVATIVE FREE 10 ML: 5 INJECTION INTRAVENOUS at 08:35

## 2023-04-14 RX ADMIN — ISOSORBIDE DINITRATE 2.5 MG: 10 TABLET ORAL at 08:35

## 2023-04-14 ASSESSMENT — PAIN DESCRIPTION - DESCRIPTORS: DESCRIPTORS: ACHING

## 2023-04-14 ASSESSMENT — PAIN SCALES - GENERAL
PAINLEVEL_OUTOF10: 0
PAINLEVEL_OUTOF10: 4

## 2023-04-14 ASSESSMENT — PAIN DESCRIPTION - ORIENTATION: ORIENTATION: RIGHT;LEFT

## 2023-04-14 ASSESSMENT — PAIN DESCRIPTION - LOCATION: LOCATION: HEAD

## 2023-04-14 NOTE — PRE SEDATION
Sedation Plan  ASA: class 3 - patient with severe systemic disease     Mallampati class: II - soft palate, uvula, fauces visible. Sedation plan: local anesthesia and moderate (conscious sedation)    Risks, benefits, and alternatives discussed with patient. Use of blood products discussed with patient who consented to blood products. Immediate reassessment prior to sedation:  Patient's status reviewed and vital signs assessed; acceptable to perform procedure and proceed to administer sedation as planned.

## 2023-04-14 NOTE — CONSULTS
[facility]      Patient: Argelia Man MRN: [id number]  SSN: [id number]    YOB: 1965  Age: 56 y.o. Sex: female    Date of Consultation: 04/13/2023  Referring Physician:    Reason for Consultation: Chest pain    Chief complain: Chest pain    HPI:  46-year-old female came for outpatient stress test.She is complaining of left-sided chest pain for last few weeks. Chest pain is on left side, squeezing type and sometimes burning type, lasts for 10-15 minutes, radiates to jaw and associated with shortness of breath and sometimes dizziness. Chest pain is with and without exertion. He denies any orthopnea or PND. Patient woke up from the sleep few times with chest pain. She was complaining of fluttering in the past and cough was helping her but for sometimes she is not having any fluttering feeling. Denies any palpitation, presyncope or syncope. Denies any smoking or alcohol abuse. She has family history of coronary artery disease. Father was diagnosed with CAD and had CABG in his late 62s. Mother was diagnosed with atrial fibrillation. Patient had ER visit and she was discharged after initial workup. Patient developed significant chest pain after the stress test.  After imaging chest pain got worse and she was given sublingual nitroglycerin which cause residual duration of chest discomfort.   Patient was transferred to the emergency room for further management    Past Medical History:   Diagnosis Date    H/O total thyroidectomy 01/01/1999     Past Surgical History:   Procedure Laterality Date    HYSTERECTOMY (CERVIX STATUS UNKNOWN) Bilateral     THYROIDECTOMY, COMPLETION Bilateral      Current Facility-Administered Medications   Medication Dose Route Frequency    enoxaparin (LOVENOX) injection 90 mg  1 mg/kg SubCUTAneous Q12H    sodium chloride flush 0.9 % injection 5-40 mL  5-40 mL IntraVENous 2 times per day    sodium chloride flush 0.9 % injection 5-40 mL  5-40 mL

## 2023-04-14 NOTE — DISCHARGE SUMMARY
Vamsi Ordonez is a 56 y.o. female presenting to the emergency department after being sent in by her cardiologist Dr. Veronica Duval. She is otherwise healthy except for having hypothyroidism resulting from a thyroidectomy several years ago. She has been having left-sided chest tightness that radiates into both sides of her jaw and her left arm off and on for the past 4 months and usually with exertion. She relays a similar episode occurred approximately a week ago when she was helping her daughter with her apartment and was very severe crushing chest pain radiation into her jaw into her arm but she opted not to come into the emergency room at that time because she was going to have her appointment with Dr. Veronica Duval this week. She had a cardiac stress test today and developed chest pain while having a stress test.  She was given nitroglycerin which did relieve the pain she was sent to emergency room for further evaluation. She reports associated shortness of breath. At the time of my exam she is pain-free    Hospital Course :   Pt was admitted for  chest pain with positive stress test. Acs was ruled out. Cardiac cath looks good per dr. Veronica Duval reported. Official report is still pending. Patient is cleared to be dc per cardiologist. Continue lifestyle modification. Discharge planning discussed with patient, pt agrees  with the plan and no questions and concerns at this point. Activity: activity as tolerated    Diet: cardiac diet    Follow-up: PCP Dr. Veronica Duval     Disposition: home     Minutes spent on discharge: 45 min       Labs: Results:       Chemistry Recent Labs     04/13/23  1215 04/14/23  0353    142   K 3.7 3.9   * 113*   CO2 26 25   BUN 24* 22*   GLOB  --  2.7      CBC w/Diff Recent Labs     04/13/23  1215 04/14/23  0353   WBC 4.7 4.4*   RBC 4.80 4.44   HGB 14.4 13.2   HCT 42.9 39.6    174      Cardiac Enzymes No results for input(s): CPK, REI in the last 72 hours.     Invalid input(s): Maria Luisa Awkward,

## 2023-04-14 NOTE — PROGRESS NOTES
Pt. Is all prepped and ready for procedure. 1335 Pt. Returned from cath lab, awake and alert. No c/o pain. Tr band dry and intact left wrist with immobilizer in place. 1435 Starting to release TR band per protocol. 1500 Tr band totally released. 2x2 gauze and tegaderm dressing applied to site. 1615 Pt. Transferred to room 346 via bed in c/o friend. No c/o pain. Pt. In stable condition. Dressing to left wrist dry and intact with immobilizer in place. Education complete.

## 2023-04-14 NOTE — BRIEF OP NOTE
Brief Postoperative Note      Patient: An Sam  YOB: 1965  MRN: [id number]    Date of Procedure: 4/14/2023    Pre-Op Diagnosis Codes:     * Unstable angina (HCC) [I20.0]     * Abnormal stress test [R94.39]    Post-Op Diagnosis:  Chest pain       Procedure(s):  Left heart cath / coronary angiography    Surgeon(s):  Casie Skaggs MD    Assistant:  * No surgical staff found *    Anesthesia: IV Sedation    Estimated Blood Loss (mL): Minimal    Complications: None    Specimens:   * No specimens in log *    Implants:  * No implants in log *      Drains: * No LDAs found *    Findings: LHC and Coronary angiogram done via left radial approach. Coronary angiogram revealed luminal irregularities in proximal RCA. LV gram was not done. LVEDP 12 mm hg. No significant gradient on pull back. Patient tolerated procedure well. Scant blood loss. No complications. No specimen removed. Recommendation:    Medication considered:   Aspirin, beta blocker, ACEI, Nitrates and statin     CAD risk factor education  Diet education  Control cholesterol  Blood pressure control  Exercise education: Age and functional status appropriate. Consider evaluation for other sources of reported symptoms. No driving for 24 hours. No weight lifting no more than 10 lb from left hand for 1 week.       Electronically signed by Casie Skaggs MD on 4/14/2023 at 1:41 PM

## 2023-04-14 NOTE — PROGRESS NOTES
Case Management Assessment  Initial Evaluation    Date/Time of Evaluation: 4/14/2023 10:45 AM  Assessment Completed by: Pearle Gaucher, RN    If patient is discharged prior to next notation, then this note serves as note for discharge by case management. Patient Name: Yen Blunt                   YOB: 1965  Diagnosis: Acute chest pain [R07.9]  Chest pain, unspecified type [R07.9]                   Date / Time: 4/13/2023 12:06 PM    Patient Admission Status: Inpatient   Readmission Risk (Low < 19, Mod (19-27), High > 27): Readmission Risk Score: 5.3    Current PCP: Ron Massey, DO  PCP verified by CM? Yes    Chart Reviewed: Yes      History Provided by: Patient  Patient Orientation: Alert and Oriented    Patient Cognition: Alert    Hospitalization in the last 30 days (Readmission):  No    If yes, Readmission Assessment in CM Navigator will be completed. Advance Directives:      Code Status: Full Code   Patient's Primary Decision Maker is: Legal Next of Kin      Discharge Planning:    Patient lives with: Spouse/Significant Other Type of Home: House  Primary Care Giver: Self  Patient Support Systems include: Spouse/Significant Other   Current Financial resources: Other (Comment) ([place])  Current community resources:    Current services prior to admission: None            Current DME:              Type of Home Care services:  None    ADLS  Prior functional level: Independent in ADLs/IADLs  Current functional level: Independent in ADLs/IADLs    PT AM-PAC:   /24  OT AM-PAC:   /24    Family can provide assistance at DC: Yes  Would you like Case Management to discuss the discharge plan with any other family members/significant others, and if so, who?     Plans to Return to Present Housing: Yes  Other Identified Issues/Barriers to RETURNING to current housing: none  Potential Assistance needed at discharge: Prescription Assistance (possible need for medication coupon)            Potential

## 2023-04-14 NOTE — PROGRESS NOTES
TRANSFER - OUT REPORT:    Verbal report given to Jose on [place] August  being transferred to [facility]  for routine progression of patient care       Report consisted of patient's Situation, Background, Assessment and   Recommendations(SBAR). Information from the following report(s) Nurse Handoff Report, ED SBAR, Intake/Output, MAR, Cardiac Rhythm NSR, Procedure Verification, Neuro Assessment, and Event Log was reviewed with the receiving nurse. [place] Assessment: No data recorded  Lines:   Peripheral IV 04/13/23 Right Antecubital (Active)   Site Assessment Clean, dry & intact 04/14/23 1052   Line Status Flushed;Capped 04/14/23 15[facility] Connections checked and tightened 04/14/23 1052   Phlebitis Assessment No symptoms 04/14/23 1052   Infiltration Assessment 0 04/14/23 1052   Alcohol Cap Used Yes 04/14/23 1052   Dressing Status Clean, dry & intact 04/14/23 1052   Dressing Type Transparent 04/14/23 1052        Opportunity for questions and clarification was provided.       Patient transported with:  Monitor and Registered Nurse

## 2023-04-14 NOTE — DISCHARGE INSTRUCTIONS
HEART CATHETERIZATION/ANGIOGRAPHY DISCHARGE INSTRUCTIONS    Check puncture site frequently for swelling or bleeding. If there is any bleeding, lie down and apply pressure over the area with a clean towel or washcloth. Notify your doctor for any redness, swelling, drainage, or oozing from the puncture site. Notify your doctor for any fever or chills. If the extremity becomes cold, numb, or painful go to ER  Activity should be limited for the next 48 hours. Climb stairs as little as possible and avoid any stooping, bending, or strenuous activity for 48 hours. No heavy lifting (anything over 10 pounds) for 3 days. You may resume your usual diet. Drink more fluids than usual.  Have a responsible person drive you home and stay with you for at least 24 hours after your heart catheterization/angiography. You may remove bandage from your Left and Arm in 24 hours. You may shower in 24 hours. No tub baths, hot tubs, or swimming for 1 week. Do not place any lotions, creams, powders, or ointments over puncture site for 1 week. You may place a clean band-aid over the puncture site each day for 5 days. Change daily. I have read the above instructions and have had the opportunity to ask questions. Patient: ________________________   Date: 4/14/2023    Witness: _______________________   Date: 4/14/2023  DISCHARGE SUMMARY from Nurse    PATIENT INSTRUCTIONS:    After general anesthesia or intravenous sedation, for 24 hours or while taking prescription Narcotics:  Limit your activities  Do not drive and operate hazardous machinery  Do not make important personal or business decisions  Do  not drink alcoholic beverages  If you have not urinated within 8 hours after discharge, please contact your surgeon on call.     Report the following to your surgeon:  Excessive pain, swelling, redness or odor of or around the surgical area  Temperature over 100.5  Nausea and vomiting lasting longer than 4 hours or if unable to take

## 2023-04-14 NOTE — INTERVAL H&P NOTE
Update History & Physical    The patient's History and Physical of April 13, 2023 was reviewed with the patient and I examined the patient. There was no change. The surgical site was confirmed by the patient and me. Plan: The risks, benefits, expected outcome, and alternative to the recommended procedure have been discussed with the patient. Patient understands and wants to proceed with the procedure.      Electronically signed by Dax Hale MD on 4/14/2023 at 1:11 PM

## 2023-04-14 NOTE — POST SEDATION
Sedation Post Procedure Note    Patient Name: Alexandru Moran   YOB: 1965  Room/Bed: [facility]  Medical Record Number: [id number]  Date: 4/14/2023   Time: 1:41 PM         Physicians/Assistants: Jarrett Mcgrath MD, MD    Procedure Performed:  LHC, coronary angiogram +/- PCI    Post-Sedation Vital Signs:  Vitals:    04/14/23 1335   BP: 124/78   Pulse: 64   Resp: 22   Temp:    SpO2: 98%      Vital signs were reviewed and were stable after the procedure (see flow sheet for vitals)            Post-Sedation Exam: Lungs: clear and Cardiovascular: normal           Complications: none    Electronically signed by Jarrett Mcgrath MD on 4/14/2023 at 1:41 PM

## 2023-04-14 NOTE — PLAN OF CARE
Problem: Discharge Planning  Goal: Discharge to home or other facility with appropriate resources  4/13/2023 2230 by Elicia Quiñones RN  Outcome: Progressing  Flowsheets (Taken 4/13/2023 1945)  Discharge to home or other facility with appropriate resources:   Identify barriers to discharge with patient and caregiver   Identify discharge learning needs (meds, wound care, etc)  4/13/2023 1740 by Tima Mcmahon RN  Outcome: Progressing     Problem: Pain  Goal: Verbalizes/displays adequate comfort level or baseline comfort level  4/13/2023 2230 by Elicia Quiñones RN  Outcome: Progressing  Flowsheets (Taken 4/13/2023 1945)  Verbalizes/displays adequate comfort level or baseline comfort level:   Encourage patient to monitor pain and request assistance   Assess pain using appropriate pain scale  4/13/2023 1740 by Tima Mcmahon RN  Outcome: Progressing

## 2023-04-14 NOTE — PROGRESS NOTES
CM notes patient to discharge. CM spoke with patient's spouse as patient will need to call her PCP and cardiologist Monday as they are currently closed. Patient's friend is driving her home.

## 2023-04-15 LAB — ECHO BSA: 2.01 M2

## 2023-04-21 ENCOUNTER — APPOINTMENT (OUTPATIENT)
Facility: HOSPITAL | Age: 58
End: 2023-04-21
Payer: COMMERCIAL

## 2023-04-28 ENCOUNTER — APPOINTMENT (OUTPATIENT)
Facility: HOSPITAL | Age: 58
End: 2023-04-28
Payer: COMMERCIAL

## (undated) DEVICE — DRAPE,ANGIO,BRACH,STERILE,38X44: Brand: MEDLINE

## (undated) DEVICE — SPLINT WR VELC FOAM NEUT POS DISP FOR RAD ART ACC SFT STRP

## (undated) DEVICE — Device

## (undated) DEVICE — STOPCOCK TRNSDUC 500PSI 3 W ROT M LUER LT BLU OFF HNDL R

## (undated) DEVICE — CATHETER DIAG 5FR L100CM LUMN ID0.047IN JL3.5 CRV 0 SIDE H

## (undated) DEVICE — ELECTRODES QUIK COMBO RTS DEFIB

## (undated) DEVICE — PACK PROCEDURE SURG CATH CUST

## (undated) DEVICE — GLIDESHEATH SLENDER STAINLESS STEEL KIT: Brand: GLIDESHEATH SLENDER

## (undated) DEVICE — GUIDEWIRE VASC L260CM DIA0.035IN TIP L3MM STD EXCHG PTFE J

## (undated) DEVICE — BAND COMPR L24CM REG CLR PLAS HEMSTAT EXT HK AND LOOP RETEN

## (undated) DEVICE — SENSOR PLSE OXMTR AD CBL L36IN ADH FRM FIT SPO2 DISP

## (undated) DEVICE — DRAPE EP LT SUBCLAV ENTRY SHLD SORBX